# Patient Record
Sex: MALE | HISPANIC OR LATINO | Employment: FULL TIME | URBAN - METROPOLITAN AREA
[De-identification: names, ages, dates, MRNs, and addresses within clinical notes are randomized per-mention and may not be internally consistent; named-entity substitution may affect disease eponyms.]

---

## 2017-05-18 ENCOUNTER — HOSPITAL ENCOUNTER (OUTPATIENT)
Dept: LAB | Facility: MEDICAL CENTER | Age: 42
End: 2017-05-18
Attending: FAMILY MEDICINE
Payer: COMMERCIAL

## 2017-05-18 LAB
ALBUMIN SERPL BCP-MCNC: 4.3 G/DL (ref 3.2–4.9)
ALBUMIN/GLOB SERPL: 1.5 G/DL
ALP SERPL-CCNC: 60 U/L (ref 30–99)
ALT SERPL-CCNC: 35 U/L (ref 2–50)
ANION GAP SERPL CALC-SCNC: 6 MMOL/L (ref 0–11.9)
AST SERPL-CCNC: 24 U/L (ref 12–45)
BASOPHILS # BLD AUTO: 0.3 % (ref 0–1.8)
BASOPHILS # BLD: 0.01 K/UL (ref 0–0.12)
BILIRUB SERPL-MCNC: 0.4 MG/DL (ref 0.1–1.5)
BUN SERPL-MCNC: 20 MG/DL (ref 8–22)
CALCIUM SERPL-MCNC: 9.3 MG/DL (ref 8.5–10.5)
CHLORIDE SERPL-SCNC: 104 MMOL/L (ref 96–112)
CHOLEST SERPL-MCNC: 176 MG/DL (ref 100–199)
CO2 SERPL-SCNC: 29 MMOL/L (ref 20–33)
CREAT SERPL-MCNC: 1.03 MG/DL (ref 0.5–1.4)
EOSINOPHIL # BLD AUTO: 0.08 K/UL (ref 0–0.51)
EOSINOPHIL NFR BLD: 2.3 % (ref 0–6.9)
ERYTHROCYTE [DISTWIDTH] IN BLOOD BY AUTOMATED COUNT: 35.9 FL (ref 35.9–50)
EST. AVERAGE GLUCOSE BLD GHB EST-MCNC: 123 MG/DL
GFR SERPL CREATININE-BSD FRML MDRD: >60 ML/MIN/1.73 M 2
GLOBULIN SER CALC-MCNC: 2.9 G/DL (ref 1.9–3.5)
GLUCOSE SERPL-MCNC: 91 MG/DL (ref 65–99)
HBA1C MFR BLD: 5.9 % (ref 0–5.6)
HCT VFR BLD AUTO: 46.6 % (ref 42–52)
HDLC SERPL-MCNC: 39 MG/DL
HGB BLD-MCNC: 15.4 G/DL (ref 14–18)
IMM GRANULOCYTES # BLD AUTO: 0.01 K/UL (ref 0–0.11)
IMM GRANULOCYTES NFR BLD AUTO: 0.3 % (ref 0–0.9)
LDLC SERPL CALC-MCNC: 107 MG/DL
LYMPHOCYTES # BLD AUTO: 1.44 K/UL (ref 1–4.8)
LYMPHOCYTES NFR BLD: 42 % (ref 22–41)
MCH RBC QN AUTO: 27.3 PG (ref 27–33)
MCHC RBC AUTO-ENTMCNC: 33 G/DL (ref 33.7–35.3)
MCV RBC AUTO: 82.6 FL (ref 81.4–97.8)
MONOCYTES # BLD AUTO: 0.42 K/UL (ref 0–0.85)
MONOCYTES NFR BLD AUTO: 12.2 % (ref 0–13.4)
NEUTROPHILS # BLD AUTO: 1.47 K/UL (ref 1.82–7.42)
NEUTROPHILS NFR BLD: 42.9 % (ref 44–72)
NRBC # BLD AUTO: 0 K/UL
NRBC BLD AUTO-RTO: 0 /100 WBC
PLATELET # BLD AUTO: 148 K/UL (ref 164–446)
PMV BLD AUTO: 12.8 FL (ref 9–12.9)
POTASSIUM SERPL-SCNC: 4.3 MMOL/L (ref 3.6–5.5)
PROT SERPL-MCNC: 7.2 G/DL (ref 6–8.2)
RBC # BLD AUTO: 5.64 M/UL (ref 4.7–6.1)
SODIUM SERPL-SCNC: 139 MMOL/L (ref 135–145)
TRIGL SERPL-MCNC: 149 MG/DL (ref 0–149)
TSH SERPL DL<=0.005 MIU/L-ACNC: 1.78 UIU/ML (ref 0.3–3.7)
WBC # BLD AUTO: 3.4 K/UL (ref 4.8–10.8)

## 2017-05-18 PROCEDURE — 83036 HEMOGLOBIN GLYCOSYLATED A1C: CPT

## 2017-05-18 PROCEDURE — 80061 LIPID PANEL: CPT

## 2017-05-18 PROCEDURE — 84443 ASSAY THYROID STIM HORMONE: CPT

## 2017-05-18 PROCEDURE — 36415 COLL VENOUS BLD VENIPUNCTURE: CPT

## 2017-05-18 PROCEDURE — 85025 COMPLETE CBC W/AUTO DIFF WBC: CPT

## 2017-05-18 PROCEDURE — 80053 COMPREHEN METABOLIC PANEL: CPT

## 2018-04-30 ENCOUNTER — APPOINTMENT (OUTPATIENT)
Dept: BEHAVIORAL HEALTH | Facility: PHYSICIAN GROUP | Age: 43
End: 2018-04-30
Payer: COMMERCIAL

## 2018-09-12 ENCOUNTER — OFFICE VISIT (OUTPATIENT)
Dept: MEDICAL GROUP | Facility: MEDICAL CENTER | Age: 43
End: 2018-09-12
Payer: COMMERCIAL

## 2018-09-12 VITALS
RESPIRATION RATE: 16 BRPM | SYSTOLIC BLOOD PRESSURE: 130 MMHG | TEMPERATURE: 97.4 F | BODY MASS INDEX: 34.23 KG/M2 | WEIGHT: 213 LBS | DIASTOLIC BLOOD PRESSURE: 76 MMHG | HEART RATE: 71 BPM | OXYGEN SATURATION: 97 % | HEIGHT: 66 IN

## 2018-09-12 DIAGNOSIS — F31.81 BIPOLAR 2 DISORDER (HCC): ICD-10-CM

## 2018-09-12 DIAGNOSIS — I10 ESSENTIAL HYPERTENSION: ICD-10-CM

## 2018-09-12 DIAGNOSIS — E66.9 OBESITY (BMI 30-39.9): ICD-10-CM

## 2018-09-12 DIAGNOSIS — D69.6 THROMBOCYTOPENIA (HCC): ICD-10-CM

## 2018-09-12 DIAGNOSIS — D72.819 LEUKOPENIA, UNSPECIFIED TYPE: ICD-10-CM

## 2018-09-12 DIAGNOSIS — R73.01 IMPAIRED FASTING GLUCOSE: ICD-10-CM

## 2018-09-12 PROCEDURE — 99203 OFFICE O/P NEW LOW 30 MIN: CPT | Performed by: NURSE PRACTITIONER

## 2018-09-12 RX ORDER — LOSARTAN POTASSIUM 25 MG/1
25 TABLET ORAL DAILY
Qty: 30 TAB | Refills: 11 | Status: SHIPPED | OUTPATIENT
Start: 2018-09-12 | End: 2019-07-26

## 2018-09-12 RX ORDER — TRAZODONE HYDROCHLORIDE 100 MG/1
200 TABLET ORAL NIGHTLY
COMMUNITY
End: 2018-10-29 | Stop reason: SDUPTHER

## 2018-09-12 RX ORDER — LOSARTAN POTASSIUM 25 MG/1
25 TABLET ORAL DAILY
COMMUNITY
End: 2018-09-12 | Stop reason: SDUPTHER

## 2018-09-12 RX ORDER — ARIPIPRAZOLE 10 MG/1
10 TABLET ORAL DAILY
COMMUNITY
End: 2018-10-29 | Stop reason: SDUPTHER

## 2018-09-12 ASSESSMENT — PATIENT HEALTH QUESTIONNAIRE - PHQ9: CLINICAL INTERPRETATION OF PHQ2 SCORE: 0

## 2018-09-12 NOTE — PROGRESS NOTES
Subjective:      Alfonso Gudino is a 43 y.o. male who presents with Establish Care and Medication Refill        CC: This is a former patient of  here to establish care mainly for blood pressure and prediabetes.    HPI Alfonso Gudino      1. Impaired fasting glucose  Review of most recent lab work from May of last year shows hemoglobin A1c of 5.9. Patient is on Abilify and feels this has caused weight gain and increased his risk for diabetes. He denies polyuria or polyphagia.    2. Thrombocytopenia (HCC)  Review of CBC results from the other offices show mild thrombocytopenia with platelet count 148. It appears that it has been running fairly persistent and patient does not report any bleeding issues. He is on trazodone, Abilify and aspirin.    3. Leukopenia, unspecified type  His last white blood cell count from a year ago also was mildly low at 3.4. His white blood cell count from 2013 was normal.    4. Essential hypertension  Blood pressure noted to be elevated in the office today but patient states he has been out of his lisinopril 10 mg.    5. Bipolar 2 disorder (HCC)  Patient currently going to [facility] although he states he will be changing his psychiatrist soon. He has been on various medicines over the years but currently is on Abilify and trazodone. He states this is for bipolar disorder.    6. Obesity (BMI 30-39.9)  Patient reports his weight has been fairly stable.  Social History   Substance Use Topics   • Smoking status: Never Smoker   • Smokeless tobacco: Former User   • Alcohol use No     Current Outpatient Prescriptions   Medication Sig Dispense Refill   • traZODone (DESYREL) 100 MG Tab Take 100 mg by mouth every evening.     • ARIPiprazole (ABILIFY) 10 MG Tab Take 10 mg by mouth every day.     • losartan (COZAAR) 25 MG Tab Take 1 Tab by mouth every day. 30 Tab 11     No current facility-administered medications for this visit.      Past Medical History:   Diagnosis Date   • Angina    •  "Backpain    • Depression    • Hypertension    • Myocardial infarct (HCC)      Family History   Problem Relation Age of Onset   • Diabetes Mother    • Arthritis Mother    • Hypertension Mother    • No Known Problems Father        Review of Systems   All other systems reviewed and are negative.         Objective:     /76   Pulse 71   Temp 36.3 °C (97.4 °F)   Resp 16   Ht 1.676 m (5' 6\")   Wt 96.6 kg (213 lb)   SpO2 97%   BMI 34.38 kg/m²      Physical Exam   Constitutional: He is oriented to person, place, and time. He appears well-developed and well-nourished. No distress.   HENT:   Head: Normocephalic and atraumatic.   Right Ear: External ear normal.   Left Ear: External ear normal.   Nose: Nose normal.   Mouth/Throat: Oropharynx is clear and moist.   Eyes: Conjunctivae are normal. Right eye exhibits no discharge. Left eye exhibits no discharge.   Neck: Normal range of motion. Neck supple. No tracheal deviation present. No thyromegaly present.   Cardiovascular: Normal rate, regular rhythm and normal heart sounds.    No murmur heard.  Pulmonary/Chest: Effort normal and breath sounds normal. No respiratory distress. He has no wheezes. He has no rales.   Lymphadenopathy:     He has no cervical adenopathy.   Neurological: He is alert and oriented to person, place, and time. Coordination normal.   Skin: Skin is warm and dry. No rash noted. He is not diaphoretic. No erythema.   Psychiatric: He has a normal mood and affect. His behavior is normal. Judgment and thought content normal.   Nursing note and vitals reviewed.              Assessment/Plan:     1. Impaired fasting glucose  Patient at risk for type 2 diabetes with his age, family history and weight so I told him I would like to see a hemoglobin A1c and have him do regular lab work status once a year. He will follow-up if he needs to go on medications in the next few weeks.  - COMP METABOLIC PANEL; Future  - LIPID PROFILE; Future  - HEMOGLOBIN A1C; " Future    2. Thrombocytopenia (HCC)  Possibly secondary to his aspirin and trazodone although he reports no bleeding issues. I will continue to monitor.  - CBC WITH DIFFERENTIAL; Future    3. Leukopenia, unspecified type  Patient will do lab work to see if hematology referral is needed in the future.  - CBC WITH DIFFERENTIAL; Future    4. Essential hypertension  I will switch patient from lisinopril to losartan since there is less chance for cough with this medication. He has been out of his lisinopril for a week. I told him I would like to see his blood pressure closer to 130/80 or less.  - TSH; Future  - losartan (COZAAR) 25 MG Tab; Take 1 Tab by mouth every day.  Dispense: 30 Tab; Refill: 11    5. Bipolar 2 disorder (HCC)  Patient will continue to follow with psychiatry who is prescribing his psychiatric medicines. He asked if I would take over prescribing but I told him that since this is for bipolar disorder, it would be better for him to continue to follow with a mental health professional.  - TSH; Future    6. Obesity (BMI 30-39.9)    - Patient identified as having weight management issue.  Appropriate orders and counseling given.

## 2018-10-29 ENCOUNTER — OFFICE VISIT (OUTPATIENT)
Dept: BEHAVIORAL HEALTH | Facility: CLINIC | Age: 43
End: 2018-10-29
Payer: COMMERCIAL

## 2018-10-29 VITALS
DIASTOLIC BLOOD PRESSURE: 78 MMHG | HEART RATE: 64 BPM | BODY MASS INDEX: 36.15 KG/M2 | SYSTOLIC BLOOD PRESSURE: 159 MMHG | WEIGHT: 217 LBS | HEIGHT: 65 IN

## 2018-10-29 DIAGNOSIS — F31.81 BIPOLAR 2 DISORDER (HCC): ICD-10-CM

## 2018-10-29 DIAGNOSIS — Z79.899 ENCOUNTER FOR LONG-TERM (CURRENT) USE OF MEDICATIONS: ICD-10-CM

## 2018-10-29 PROCEDURE — 99203 OFFICE O/P NEW LOW 30 MIN: CPT | Performed by: PSYCHIATRY & NEUROLOGY

## 2018-10-29 RX ORDER — TRAZODONE HYDROCHLORIDE 100 MG/1
200 TABLET ORAL
Qty: 180 TAB | Refills: 1 | Status: SHIPPED | OUTPATIENT
Start: 2018-10-29 | End: 2019-02-25 | Stop reason: SDUPTHER

## 2018-10-29 RX ORDER — ARIPIPRAZOLE 10 MG/1
10 TABLET ORAL DAILY
Qty: 90 TAB | Refills: 1 | Status: SHIPPED | OUTPATIENT
Start: 2018-10-29 | End: 2019-02-25

## 2018-10-29 NOTE — PROGRESS NOTES
INITIAL PSYCHIATRY EVALUATION      Chief Complaint   Patient presents with   • Establish Care     history of bipolar mood disorder         History Of Present Illness:  Alfonso Gudino is a 43 y.o. old male with history of bipolar mood disorder, hypertension comes in today to establish care.  He was previously being followed at the [facility] but is transferring his care as he now has a better commercial insurance.  He was diagnosed with bipolar mood disorder in his early 30s when he was exhibiting mood and psychotic symptoms and is having significant relationship stressors with his now ex-wife.  He describes his mood symptoms as racing thoughts, impulsivity, decreased need for sleep lasting for several days in a row.  He also reports having some delusions of persecution.  He was having some financial stressors and was worried about being watched at that time.  He was also hospitalized in early 2000s for an episode where he barricaded him self and his family in a house and refused to come out.  Police was called who were able to get him and his family at safe.  He does not recall a lot about that incident but was started on medications after that.  He has been on Abilify for the last 3 or 4 years and endorses good benefit on his symptoms.  He denies any current or recent hypomanic, manic or psychotic symptoms.  He was also struggling with depression when he was having psychosocial stressors with his ex-wife but denies any current or recent symptoms of depression.  He has good support from his second wife and his mother who lives close by as well.  He denies any current legal stressors.  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  He recently got his JONELLE card and feels that that will help him not spend too much money on gambling.  He denies struggling with gambling before.  He denies any current active or passive thoughts of  wanting to hurt himself or others.    Current psychiatric medications - Abilify 10 mg (x 3-4 years), Trazodone 200 mg    Past Psychiatric History:  Prior psychiatric hospitalization - 2 in early to  for aggression and mood symptoms.  His first hospitalization was in [place] and second 1 in [place].  Self harm/suicide attempt - Denies  Previous medication trials - unable to recall    Past Medical/Surgical History:  Past Medical History:   Diagnosis Date   • Angina    • Backpain    • Depression    • Hypertension    • Myocardial infarct (HCC)      History reviewed. No pertinent surgical history.    Family Psychiatric History:  Father - psychotic disorder   Mother - depressive disorder    Substance Use/Addiction History:  Alcohol - Denies  Nicotine - Denies  Illicit drugs - Denies    Social History:  He was born in [facility] and was raised by his mother primarily.  He and his mother immigrated to [place] when he was 8 or 9 years old.  His mother lives close by him and his father lives in [place] but he does not have a relationship with him anymore.  He has 3/2 siblings and has minimal relationships with them.  He has been  x2 and  x1.  He had 4 kids from his first marriage but his son  at the age of 3.  His daughters live in [place] and they are all over 18 years of age.  He lives in [place] with his wife of 2 years.  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    Allergies:  Lisinopril    Medications:  Current Outpatient Prescriptions   Medication Sig Dispense Refill   • ARIPiprazole (ABILIFY) 10 MG Tab Take 1 Tab by mouth every day. 90 Tab 1   • traZODone (DESYREL) 100 MG Tab Take 2 Tabs by mouth every bedtime. 180 Tab 1   • losartan (COZAAR) 25 MG Tab Take 1 Tab by mouth every day. 30 Tab 11     No current facility-administered medications for this visit.        Review of Symptoms:  Constitutional - Negative for fatigue  Eyes - Negative for blurry  "vision  HEENT - Negative for sore throat  Respiratory - Negative for shortness of breath, cough  CVS - Negative for chest pain, palpitations  GI - Negative for nausea, vomiting, abdominal pain, diarrhea, constipation  Skin - Negative for rash  Musculoskeletal - Negative for back pain  Neurological - Negative for headaches  Psychiatric - Negative for depression, hypomania, corina, psychosis    Physical Examination:  Vital signs: /78   Pulse 64   Ht 1.651 m (5' 5\")   Wt 98.4 kg (217 lb)   BMI 36.11 kg/m²     Musculoskeletal: Normal gait. No abnormal movements.     Mental Status Evaluation:   General: Middle aged  male, dressed in casual attire, good grooming and hygiene, in no apparent distress, calm and cooperative, good eye contact, no psychomotor agitation or retardation  Orientation: Alert and oriented to person, place and time  Recent and remote memory: Intact  Attention span and concentration: Intact  Speech: Spontaneous, normal rate, rhythm and tone  Thought Process: Linear, logical and goal directed  Thought Content: Denies suicidal or homicidal ideations, intent or plan  Perception: Denies auditory or visual hallucinations. No delusions noted  Associations: Intact  Language: Appropriate  Fund of knowledge and vocabulary: Adequate  Mood: \"good\"  Affect: Euthymic, mood congruent  Insight: Good  Judgment: Good    Depression screening:  Depression Screen (PHQ-2/PHQ-9) 9/12/2018   PHQ-2 Total Score 0     Interpretation of PHQ-9 Total Score   Score Severity   1-4 No Depression   5-9 Mild Depression   10-14 Moderate Depression   15-19 Moderately Severe Depression   20-27 Severe Depression    Medical Records/Labs/Diagnostic Tests Reviewed:  [place]  records - no prescribed controlled medications found in the last 3 years    Impression:  1.  Bipolar 2 mood disorder    Plan:  1.  Continue Abilify 10 mg in the morning for mood stabilization   - AIMS 0 today   - Metabolic monitoring (5/2017): A1c elevated " at 5.9, LDL elevated at 107   - A1c and lipid profile ordered for yearly metabolic monitoring  2.  Continue Trazodone 200 mg at bedtime for sleep    Return to clinic in 4 months or sooner if symptoms worsen    The proposed treatment plan was discussed with the patient who was provided the opportunity to ask questions and make suggestions regarding alternative treatment. Patient verbalized understanding and expressed agreement with the plan.     Gia Sapp M.D.  10/29/18    This note was created using voice recognition software (Dragon). The accuracy of the dictation is limited by the abilities of the software. I have reviewed the note prior to signing, however some errors in grammar and context are still possible. If you have any questions related to this note please do not hesitate to contact our office.

## 2019-02-25 ENCOUNTER — OFFICE VISIT (OUTPATIENT)
Dept: BEHAVIORAL HEALTH | Facility: CLINIC | Age: 44
End: 2019-02-25
Payer: COMMERCIAL

## 2019-02-25 VITALS
SYSTOLIC BLOOD PRESSURE: 159 MMHG | DIASTOLIC BLOOD PRESSURE: 90 MMHG | HEART RATE: 62 BPM | HEIGHT: 65 IN | BODY MASS INDEX: 35.65 KG/M2 | WEIGHT: 214 LBS

## 2019-02-25 DIAGNOSIS — F31.81 BIPOLAR 2 DISORDER (HCC): ICD-10-CM

## 2019-02-25 PROCEDURE — 99213 OFFICE O/P EST LOW 20 MIN: CPT | Performed by: PSYCHIATRY & NEUROLOGY

## 2019-02-25 RX ORDER — ARIPIPRAZOLE 15 MG/1
15 TABLET ORAL DAILY
Qty: 90 TAB | Refills: 0 | Status: SHIPPED | OUTPATIENT
Start: 2019-02-25 | End: 2019-05-28

## 2019-02-25 RX ORDER — TRAZODONE HYDROCHLORIDE 100 MG/1
200 TABLET ORAL
Qty: 180 TAB | Refills: 0 | Status: SHIPPED | OUTPATIENT
Start: 2019-02-25 | End: 2019-05-28 | Stop reason: SDUPTHER

## 2019-02-25 NOTE — PROGRESS NOTES
PSYCHIATRY FOLLOW-UP NOTE      Chief Complaint   Patient presents with   • Follow-Up     mood         History Of Present Illness:  Alfonso Gudino is a 43 y.o. old male with bipolar 2 mood disorder, hypertension comes in today for follow up, was last seen for an initial evaluation 4 months ago.  He reports doing good in regards to his mood since his last appointment with me.  He continues to be compliant with Abilify and endorses benefit on his mood symptoms.  He denies any recent or current symptoms consistent with depression, hypomania or corina.  He denies struggling with anger or irritability.  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  He has noticed that if he misses his Abilify and takes to the next day he is more focused at work.  He denies any recent episodes of gambling.  He works at a local [employer] and does have some cravings to go negro but has not done any gambling since his last appointment with me.  He denies any increase in his psychosocial stressors.  He reports good sleep with 200 mg of Trazodone.  He denies any current problems with his appetite.  He denies having active or passive thoughts of wanting to hurt himself or others.    Social History:   He was born in [facility] and was raised by his mother primarily.  He and his mother immigrated to [place] when he was 8 or 9 years old.  His mother lives close by him and his father lives in [place] but he does not have a relationship with him anymore.  He has 3 1/2 siblings but has minimal contact with them.  He has been  x2 and  x1.  He had 4 kids from his first marriage but his son  at the age of 3.  His all 3 daughters live in [place].  He lives in [place] with his wife.  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    Substance Use:  Alcohol - Denies  Nicotine - Denies  Illicit drugs -  "Denies    Past Medication Trials:  Unable to recall     Medications:  Current Outpatient Prescriptions   Medication Sig Dispense Refill   • ARIPiprazole (ABILIFY) 15 MG Tab Take 1 Tab by mouth every day. 90 Tab 0   • traZODone (DESYREL) 100 MG Tab Take 2 Tabs by mouth every bedtime. 180 Tab 0   • losartan (COZAAR) 25 MG Tab Take 1 Tab by mouth every day. (Patient not taking: Reported on 2/25/2019) 30 Tab 11     No current facility-administered medications for this visit.        Review Of Systems:    Constitutional - Positive for fatigue  Respiratory - Negative for shortness of breath, cough  CVS - Negative for chest pain, palpitations  GI - Negative for nausea, vomiting, abdominal pain, diarrhea, constipation  Musculoskeletal - Negative for back pain  Neurological - Negative for headaches  Psychiatric - Positive for impaired concentration    Physical Examination:  Vital signs: /90   Pulse 62   Ht 1.651 m (5' 5\")   Wt 97.1 kg (214 lb)   BMI 35.61 kg/m²     Musculoskeletal: Normal gait. No abnormal movements.     Mental Status Evaluation:   General: Middle aged  male, dressed in casual attire, good grooming and hygiene, in no apparent distress, calm and cooperative, good eye contact, no psychomotor agitation or retardation  Orientation: Alert and oriented to person, place and time  Recent and remote memory: Grossly intact  Attention span and concentration: Grossly intact  Speech: Spontaneous, normal rate, rhythm and tone  Thought Process: Linear, logical and goal directed  Thought Content: Denies suicidal or homicidal ideations, intent or plan  Perception: Denies auditory or visual hallucinations. No delusions noted  Associations: Intact  Language: Appropriate  Fund of knowledge and vocabulary: Grossly adequate  Mood: \"really good\"  Affect: Euthymic, mood congruent  Insight: Good  Judgment: Good    Depression screening:  Depression Screen (PHQ-2/PHQ-9) 9/12/2018   PHQ-2 Total Score 0 "     Interpretation of PHQ-9 Total Score   Score Severity   1-4 No Depression   5-9 Mild Depression   10-14 Moderate Depression   15-19 Moderately Severe Depression   20-27 Severe Depression    Medical Records/Labs/Diagnostic Tests Reviewed:  [place]  records - no prescribed controlled medications found in the last 1 years      Impression:  1.  Bipolar 2 mood disorder - stable    Plan:  1. Increase Abilify to 15 mg in the morning for mood stabilization.  He is endorsing stable mood symptoms but would like to try a higher dose to see if it helps with his concentration.  He has a history of on and off gambling ever since he has been on Abilify for the last 3-4 years.  I have informed him that Abilify has been associated with some impulse control disorders including gambling and to keep that in mind with increased dose.  I have asked him to stop taking Abilify and to contact me if he notices strong desire to gambling which he is unable to control to discuss alternative medications for his bipolar mood disorder.              - AIMS 0 (10/2018)              - Metabolic monitoring (5/2017): A1c elevated at 5.9, LDL elevated at 107              - A1c and lipid profile still pending. Reminded him to get the blood work done.  2.  Continue Trazodone 200 mg at bedtime for sleep  3.  He is off his blood pressure medication for over a month and his blood pressure was elevated today.  I have advised him to set up an appointment with his PCP to discuss this further.    Return to clinic in 3 months or sooner if symptoms worsen    The proposed treatment plan was discussed with the patient who was provided the opportunity to ask questions and make suggestions regarding alternative treatment. Patient verbalized understanding and expressed agreement with the plan.     Gia Sapp M.D.  02/25/19    This note was created using voice recognition software (Dragon). The accuracy of the dictation is limited by the abilities of the software.  I have reviewed the note prior to signing, however some errors in grammar and context are still possible. If you have any questions related to this note please do not hesitate to contact our office.

## 2019-02-26 ENCOUNTER — HOSPITAL ENCOUNTER (OUTPATIENT)
Dept: LAB | Facility: MEDICAL CENTER | Age: 44
End: 2019-02-26
Attending: NURSE PRACTITIONER
Payer: COMMERCIAL

## 2019-02-26 DIAGNOSIS — D72.819 LEUKOPENIA, UNSPECIFIED TYPE: ICD-10-CM

## 2019-02-26 DIAGNOSIS — D69.6 THROMBOCYTOPENIA (HCC): ICD-10-CM

## 2019-02-26 DIAGNOSIS — R73.01 IMPAIRED FASTING GLUCOSE: ICD-10-CM

## 2019-02-26 DIAGNOSIS — I10 ESSENTIAL HYPERTENSION: ICD-10-CM

## 2019-02-26 DIAGNOSIS — F31.81 BIPOLAR 2 DISORDER (HCC): ICD-10-CM

## 2019-02-26 LAB
ALBUMIN SERPL BCP-MCNC: 4.3 G/DL (ref 3.2–4.9)
ALBUMIN/GLOB SERPL: 1.6 G/DL
ALP SERPL-CCNC: 57 U/L (ref 30–99)
ALT SERPL-CCNC: 45 U/L (ref 2–50)
ANION GAP SERPL CALC-SCNC: 4 MMOL/L (ref 0–11.9)
AST SERPL-CCNC: 25 U/L (ref 12–45)
BASOPHILS # BLD AUTO: 0.7 % (ref 0–1.8)
BASOPHILS # BLD: 0.03 K/UL (ref 0–0.12)
BILIRUB SERPL-MCNC: 0.3 MG/DL (ref 0.1–1.5)
BUN SERPL-MCNC: 14 MG/DL (ref 8–22)
CALCIUM SERPL-MCNC: 8.8 MG/DL (ref 8.5–10.5)
CHLORIDE SERPL-SCNC: 107 MMOL/L (ref 96–112)
CHOLEST SERPL-MCNC: 187 MG/DL (ref 100–199)
CO2 SERPL-SCNC: 28 MMOL/L (ref 20–33)
CREAT SERPL-MCNC: 0.98 MG/DL (ref 0.5–1.4)
EOSINOPHIL # BLD AUTO: 0.06 K/UL (ref 0–0.51)
EOSINOPHIL NFR BLD: 1.5 % (ref 0–6.9)
ERYTHROCYTE [DISTWIDTH] IN BLOOD BY AUTOMATED COUNT: 36.2 FL (ref 35.9–50)
EST. AVERAGE GLUCOSE BLD GHB EST-MCNC: 126 MG/DL
FASTING STATUS PATIENT QL REPORTED: NORMAL
GLOBULIN SER CALC-MCNC: 2.7 G/DL (ref 1.9–3.5)
GLUCOSE SERPL-MCNC: 107 MG/DL (ref 65–99)
HBA1C MFR BLD: 6 % (ref 0–5.6)
HCT VFR BLD AUTO: 46.6 % (ref 42–52)
HDLC SERPL-MCNC: 34 MG/DL
HGB BLD-MCNC: 15.2 G/DL (ref 14–18)
IMM GRANULOCYTES # BLD AUTO: 0.01 K/UL (ref 0–0.11)
IMM GRANULOCYTES NFR BLD AUTO: 0.2 % (ref 0–0.9)
LDLC SERPL CALC-MCNC: 116 MG/DL
LYMPHOCYTES # BLD AUTO: 1.48 K/UL (ref 1–4.8)
LYMPHOCYTES NFR BLD: 36.9 % (ref 22–41)
MCH RBC QN AUTO: 27.1 PG (ref 27–33)
MCHC RBC AUTO-ENTMCNC: 32.6 G/DL (ref 33.7–35.3)
MCV RBC AUTO: 83.1 FL (ref 81.4–97.8)
MONOCYTES # BLD AUTO: 0.49 K/UL (ref 0–0.85)
MONOCYTES NFR BLD AUTO: 12.2 % (ref 0–13.4)
NEUTROPHILS # BLD AUTO: 1.94 K/UL (ref 1.82–7.42)
NEUTROPHILS NFR BLD: 48.5 % (ref 44–72)
NRBC # BLD AUTO: 0 K/UL
NRBC BLD-RTO: 0 /100 WBC
PLATELET # BLD AUTO: 146 K/UL (ref 164–446)
PMV BLD AUTO: 12.4 FL (ref 9–12.9)
POTASSIUM SERPL-SCNC: 4.1 MMOL/L (ref 3.6–5.5)
PROT SERPL-MCNC: 7 G/DL (ref 6–8.2)
RBC # BLD AUTO: 5.61 M/UL (ref 4.7–6.1)
SODIUM SERPL-SCNC: 139 MMOL/L (ref 135–145)
TRIGL SERPL-MCNC: 187 MG/DL (ref 0–149)
TSH SERPL DL<=0.005 MIU/L-ACNC: 2.09 UIU/ML (ref 0.38–5.33)
WBC # BLD AUTO: 4 K/UL (ref 4.8–10.8)

## 2019-02-26 PROCEDURE — 36415 COLL VENOUS BLD VENIPUNCTURE: CPT

## 2019-02-26 PROCEDURE — 83036 HEMOGLOBIN GLYCOSYLATED A1C: CPT

## 2019-02-26 PROCEDURE — 80053 COMPREHEN METABOLIC PANEL: CPT

## 2019-02-26 PROCEDURE — 84443 ASSAY THYROID STIM HORMONE: CPT

## 2019-02-26 PROCEDURE — 85025 COMPLETE CBC W/AUTO DIFF WBC: CPT

## 2019-02-26 PROCEDURE — 80061 LIPID PANEL: CPT

## 2019-05-28 ENCOUNTER — OFFICE VISIT (OUTPATIENT)
Dept: BEHAVIORAL HEALTH | Facility: CLINIC | Age: 44
End: 2019-05-28
Payer: COMMERCIAL

## 2019-05-28 VITALS
SYSTOLIC BLOOD PRESSURE: 164 MMHG | WEIGHT: 214 LBS | BODY MASS INDEX: 35.65 KG/M2 | DIASTOLIC BLOOD PRESSURE: 85 MMHG | HEART RATE: 72 BPM | HEIGHT: 65 IN

## 2019-05-28 DIAGNOSIS — E78.5 DYSLIPIDEMIA: ICD-10-CM

## 2019-05-28 DIAGNOSIS — R73.03 PRE-DIABETES: ICD-10-CM

## 2019-05-28 DIAGNOSIS — F31.81 BIPOLAR 2 DISORDER (HCC): ICD-10-CM

## 2019-05-28 PROCEDURE — 99214 OFFICE O/P EST MOD 30 MIN: CPT | Performed by: PSYCHIATRY & NEUROLOGY

## 2019-05-28 RX ORDER — TRAZODONE HYDROCHLORIDE 100 MG/1
200 TABLET ORAL
Qty: 180 TAB | Refills: 0 | Status: SHIPPED | OUTPATIENT
Start: 2019-05-28 | End: 2019-07-26

## 2019-05-28 ASSESSMENT — PATIENT HEALTH QUESTIONNAIRE - PHQ9: CLINICAL INTERPRETATION OF PHQ2 SCORE: 0

## 2019-07-23 ENCOUNTER — OFFICE VISIT (OUTPATIENT)
Dept: BEHAVIORAL HEALTH | Facility: CLINIC | Age: 44
End: 2019-07-23
Payer: COMMERCIAL

## 2019-07-23 VITALS
HEIGHT: 65 IN | WEIGHT: 200 LBS | SYSTOLIC BLOOD PRESSURE: 167 MMHG | DIASTOLIC BLOOD PRESSURE: 91 MMHG | BODY MASS INDEX: 33.32 KG/M2 | HEART RATE: 71 BPM

## 2019-07-23 DIAGNOSIS — F51.04 CHRONIC INSOMNIA: ICD-10-CM

## 2019-07-23 DIAGNOSIS — I10 ESSENTIAL HYPERTENSION: ICD-10-CM

## 2019-07-23 DIAGNOSIS — F31.81 BIPOLAR 2 DISORDER (HCC): ICD-10-CM

## 2019-07-23 PROCEDURE — 99214 OFFICE O/P EST MOD 30 MIN: CPT | Performed by: PSYCHIATRY & NEUROLOGY

## 2019-07-23 RX ORDER — ZIPRASIDONE HYDROCHLORIDE 20 MG/1
20 CAPSULE ORAL 2 TIMES DAILY
Qty: 60 CAP | Refills: 0 | Status: SHIPPED | OUTPATIENT
Start: 2019-07-23 | End: 2019-08-21 | Stop reason: SDUPTHER

## 2019-07-23 NOTE — PROGRESS NOTES
"PSYCHIATRY FOLLOW-UP NOTE      Chief Complaint   Patient presents with   • Other     more irritable without medications         History Of Present Illness:  Alfonso Gudino is a 44 y.o. old male with bipolar 2 mood disorder, hypertension, dyslipidemia, prediabetes comes in today for follow up, was last seen 2 months ago.  He has been off his Abilify for 3+ months and recently has been noticing more irritability and agitation.  He has been working 7 days a week and is having a lot more stressors at his home as well.  He states that his stepdaughter has been living with her family for over 3 years and he has a hard time as she does not clean up after.  He also has a his stepson living with them but has a good relationship with him.  He wants his stepdaughter and her family to move out but is having a hard time with that.  He recently had a blowout with them which caused a lot of stressors in his marriage.  He does not feel like more on edge and has been told by a lot of people that he looks \"pissed\".  He denies any reckless or impulsive behaviors without Abilify.  He has not had a gambling episodes since he has been on Abilify.  He continues to use trazodone for sleep and feels that it helps most days than not.  He has made some lifestyle changes and is eating better and has lost about 14 pounds since his last appointment with me.  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  He denies any physical outbursts of anger.  He denies having thoughts of wanting to hurt himself or others.    Social History:   He was born in [facility] and was raised by his mother primarily.  He and his mother immigrated to [place] when he was 8 or 9 years old.  His mother lives close by and father lives in [place] but he does not have a relationship with him anymore.  He has 3 1/2 siblings but has minimal contact with them.  He has been  x2 and " " x1.  He had 4 kids from his first marriage but his son  at the age of 3.  His all 3 daughters live in [place].  He lives in [place] with his wife.  His stepson, stepdaughter and her  and daughter also live with them. He is employed full-time as a  at an [employer] restaurant at a local [employer].  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    Substance Use:  Alcohol - Denies  Nicotine - Denies  Illicit drugs - Denies    Past Medication Trials:  Abilify 15 mg (effective, s/e - impulse control disorder - gambling), Seroquel (s/e - \"it messed with my blood cells\")    Medications:  Current Outpatient Prescriptions   Medication Sig Dispense Refill   • ziprasidone (GEODON) 20 MG Cap Take 1 Cap by mouth 2 Times a Day. 60 Cap 0   • traZODone (DESYREL) 100 MG Tab Take 2 Tabs by mouth every bedtime. 180 Tab 0   • losartan (COZAAR) 25 MG Tab Take 1 Tab by mouth every day. (Patient not taking: Reported on 2019) 30 Tab 11     No current facility-administered medications for this visit.        Review Of Systems:    Constitutional - Negative for fatigue  Respiratory - Negative for shortness of breath, cough  CVS - Negative for chest pain, palpitations  GI - Negative for nausea, vomiting, abdominal pain, diarrhea, constipation  Musculoskeletal - Negative for back pain  Neurological - Negative for headaches  Psychiatric - Positive for irritability, agitation, infrequent poor sleep    Physical Examination:  Vital signs: BP (!) 167/91   Pulse 71   Ht 1.651 m (5' 5\")   Wt 90.7 kg (200 lb)   BMI 33.28 kg/m²     Musculoskeletal: Normal gait. No abnormal movements.     Mental Status Evaluation:   General: Middle aged  male, dressed in casual attire, good grooming and hygiene, in no apparent distress, calm and cooperative, good eye contact, no psychomotor agitation or retardation  Orientation: Alert and oriented to person, place and time  Recent and remote memory: Grossly " "intact  Attention span and concentration: Grossly intact  Speech: Spontaneous, normal rate, rhythm and tone  Thought Process: Linear, logical and goal directed  Thought Content: Denies suicidal or homicidal ideations, intent or plan  Perception: Denies auditory or visual hallucinations. No delusions noted  Associations: Intact  Language: Appropriate  Fund of knowledge and vocabulary: Grossly adequate  Mood: \"am okay\"  Affect: Euthymic, mood congruent  Insight: Good  Judgment: Good    Depression screening:  Depression Screen (PHQ-2/PHQ-9) 9/12/2018 5/28/2019   PHQ-2 Total Score 0 0     Interpretation of PHQ-9 Total Score   Score Severity   1-4 No Depression   5-9 Mild Depression   10-14 Moderate Depression   15-19 Moderately Severe Depression   20-27 Severe Depression    Medical Records/Labs/Diagnostic Tests Reviewed:  [place]  records - no prescribed controlled medications found in the last 1 year      Impression:  1.  Bipolar 2 mood disorder - worsening   2.  Hypertension - new problem  3.  Chronic insomnia - new problem    Plan:  1.  Start Geodon 20 mg twice daily for mood stabilization. Discussed side effects including headache, drowsiness, dizziness, sedation, dry mouth, constipation, weight gain, orthostatic hypotension, hypertension, dyslipidemia, hyperglycemia, diabetes mellitus, akathisia/restlessness, tremors, muscle rigidity, acute dystonia, tardive dyskinesia etc.  - AIMS 0 today  - Metabolic monitoring (2/2019): Lipid profile with elevated LDL and triglycerides, A1c elevated at 6.0  2.  He is not an appropriate candidate for Depakote at this time given thrombocytopenia, dyslipidemia and weight gain.  He would benefit more from Geodon compared to Lamictal as he is having significant irritability and agitation.  3.  Continue Trazodone 200 mg at bedtime for sleep  4.  I have advised him to make some lifestyle changes including taking 1-2 days of a week to distress himself and to discuss with his wife about " improving situation at home.  I have informed him that he really needs to work on cutting back on his stress which can precipitate hypomanic or manic episode.  5.  His blood pressure continues to be elevated and he has been off his antihypertensive medication for 3+ months.  I have encouraged him to see his PCP for appropriate management.    Return to clinic in 4 weeks or sooner if symptoms worsen    The proposed treatment plan was discussed with the patient who was provided the opportunity to ask questions and make suggestions regarding alternative treatment. Patient verbalized understanding and expressed agreement with the plan.     Gia Sapp M.D.  07/23/19    This note was created using voice recognition software (Dragon). The accuracy of the dictation is limited by the abilities of the software. I have reviewed the note prior to signing, however some errors in grammar and context are still possible. If you have any questions related to this note please do not hesitate to contact our office.

## 2019-07-26 ENCOUNTER — OFFICE VISIT (OUTPATIENT)
Dept: MEDICAL GROUP | Facility: MEDICAL CENTER | Age: 44
End: 2019-07-26
Payer: COMMERCIAL

## 2019-07-26 VITALS
TEMPERATURE: 97.8 F | SYSTOLIC BLOOD PRESSURE: 138 MMHG | OXYGEN SATURATION: 96 % | RESPIRATION RATE: 16 BRPM | HEIGHT: 65 IN | HEART RATE: 72 BPM | WEIGHT: 200 LBS | BODY MASS INDEX: 33.32 KG/M2 | DIASTOLIC BLOOD PRESSURE: 72 MMHG

## 2019-07-26 DIAGNOSIS — R73.01 IMPAIRED FASTING GLUCOSE: ICD-10-CM

## 2019-07-26 DIAGNOSIS — D69.6 THROMBOCYTOPENIA (HCC): ICD-10-CM

## 2019-07-26 DIAGNOSIS — E78.5 DYSLIPIDEMIA: ICD-10-CM

## 2019-07-26 DIAGNOSIS — D72.819 LEUKOPENIA, UNSPECIFIED TYPE: ICD-10-CM

## 2019-07-26 DIAGNOSIS — F51.04 CHRONIC INSOMNIA: ICD-10-CM

## 2019-07-26 DIAGNOSIS — I10 ESSENTIAL HYPERTENSION: ICD-10-CM

## 2019-07-26 DIAGNOSIS — F31.81 BIPOLAR 2 DISORDER (HCC): ICD-10-CM

## 2019-07-26 PROBLEM — R73.03 PRE-DIABETES: Status: RESOLVED | Noted: 2019-05-28 | Resolved: 2019-07-26

## 2019-07-26 PROCEDURE — 99214 OFFICE O/P EST MOD 30 MIN: CPT | Performed by: NURSE PRACTITIONER

## 2019-07-26 RX ORDER — LOSARTAN POTASSIUM 50 MG/1
50 TABLET ORAL DAILY
Qty: 30 TAB | Refills: 11 | Status: CANCELLED | OUTPATIENT
Start: 2019-07-26

## 2019-07-26 RX ORDER — LOSARTAN POTASSIUM 50 MG/1
50 TABLET ORAL DAILY
Qty: 30 TAB | Refills: 11 | Status: SHIPPED | OUTPATIENT
Start: 2019-07-26

## 2019-07-26 ASSESSMENT — ENCOUNTER SYMPTOMS
DEPRESSION: 1
INSOMNIA: 1

## 2019-07-26 NOTE — PROGRESS NOTES
Subjective:      Alfonso Gudino is a 44 y.o. male who presents with Hypertension        CC: Patient is here today for follow-up on impaired fasting glucose, hypertension, dyslipidemia and insomnia.  Patient last seen at this office for his initial visit in September of last year.    HPI Alfonso Gudino      1. Impaired fasting glucose  Patient has history of prediabetes with hemoglobin A1c of 5.9 and his most recent testing in February up to 6.0.  He was on Abilify and is now on Geodon.  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  He has been trying to watch his carbohydrates.    2. Thrombocytopenia ([facility])  History of thrombocytopenia with last platelet count just mildly decreased at 146 and he reports no excessive bruising or bleeding.    3. Leukopenia, unspecified type  Last white blood cell count at 4.0 with no micro or macrocytosis or anemia.  Results were higher than the previous 3.4.    4. Essential hypertension  Patient is concerned about his blood pressure.  He was on low-dose losartan 25 mg but with the recall of losartan, he has not been using it.  Apparently his blood pressure has been elevated at other offices although it is normal in the office today.    5. Dyslipidemia  Patient has had mild dyslipidemia but not high enough to warrant need of statin.      6. Bipolar 2 disorder (HCC)  Patient currently following with psychiatry which is adjusting his medications with the addition of Abilify and he has stopped his trazodone.    7. Chronic insomnia  Patient reports with the stopping of his trazodone he has had some issues with staying asleep.  He is wondering if there is anything I can prescribe for him.  Current Outpatient Prescriptions   Medication Sig Dispense Refill   • losartan (COZAAR) 50 MG Tab Take 1 Tab by mouth every day. 30 Tab 11   • ziprasidone (GEODON) 20 MG Cap Take 1 Cap by mouth 2  "Times a Day. 60 Cap 0     No current facility-administered medications for this visit.      Social History   Substance Use Topics   • Smoking status: Never Smoker   • Smokeless tobacco: Former User   • Alcohol use No      Comment: 1 glass of wine once a week     Family History   Problem Relation Age of Onset   • Diabetes Mother    • Arthritis Mother    • Hypertension Mother    • Depression Mother    • Other Father         psychosis     Past Medical History:   Diagnosis Date   • Angina    • Backpain    • Depression    • Hypertension    • Myocardial infarct (HCC)        Review of Systems   Psychiatric/Behavioral: Positive for depression. The patient has insomnia.    All other systems reviewed and are negative.         Objective:     /72 (BP Location: Right arm, Patient Position: Sitting, BP Cuff Size: Adult)   Pulse 72   Temp 36.6 °C (97.8 °F) (Temporal)   Resp 16   Ht 1.651 m (5' 5\")   Wt 90.7 kg (200 lb)   SpO2 96%   BMI 33.28 kg/m²      Physical Exam   Constitutional: He is oriented to person, place, and time. He appears well-developed and well-nourished. No distress.   HENT:   Head: Normocephalic and atraumatic.   Right Ear: External ear normal.   Left Ear: External ear normal.   Nose: Nose normal.   Mouth/Throat: Oropharynx is clear and moist.   Eyes: Conjunctivae are normal. Right eye exhibits no discharge. Left eye exhibits no discharge.   Neck: Normal range of motion. Neck supple. No tracheal deviation present. No thyromegaly present.   Cardiovascular: Normal rate, regular rhythm and normal heart sounds.    No murmur heard.  Pulmonary/Chest: Effort normal and breath sounds normal. No respiratory distress. He has no wheezes. He has no rales.   Lymphadenopathy:     He has no cervical adenopathy.   Neurological: He is alert and oriented to person, place, and time. Coordination normal.   Skin: Skin is warm and dry. No rash noted. He is not diaphoretic. No erythema.   Psychiatric: He has a normal mood " and affect. His behavior is normal. Judgment and thought content normal.   Nursing note and vitals reviewed.              Assessment/Plan:     1. Impaired fasting glucose  Patient's last hemoglobin A1c at 6.0 but he states he is decreasing his carbohydrates, losing weight and exercising.  I will have him do repeat hemoglobin A1c before his visit in 3 months.  - HEMOGLOBIN A1C; Future  - Comp Metabolic Panel; Future      2. Thrombocytopenia (HCC)  Platelet counts have been barely below normal we will continue to monitor and he is to report any excessive bruising or bleeding.  - CBC WITH DIFFERENTIAL; Future    3. Leukopenia, unspecified type  White blood cell count has been stable and he is showed no anemia.  - CBC WITH DIFFERENTIAL; Future    4. Essential hypertension  Patient's blood pressure is reported to be elevated so I will start him back on losartan but at 50 mg.  I explained that I would like to see his blood pressure 130/80 or less and that we can increase the dosage if necessary in the future.    5. Dyslipidemia  Patient has had lab work done in the last few months and it should improve somewhat with his working out daily.    6. Bipolar 2 disorder (HCC)  Patient follows with psychiatry which is prescribing his Geodon which was recently started.  He states he is in a new relationship which is helpful and much of his depression was related to his previous divorce.    7. Chronic insomnia  I advised patient to try over-the-counter melatonin but if he feels that his insomnia is severe the stopping of the trazodone, I advised him to discuss this with the psychiatrist.

## 2019-08-16 ENCOUNTER — APPOINTMENT (OUTPATIENT)
Dept: RADIOLOGY | Facility: MEDICAL CENTER | Age: 44
End: 2019-08-16
Attending: EMERGENCY MEDICINE
Payer: COMMERCIAL

## 2019-08-16 ENCOUNTER — HOSPITAL ENCOUNTER (EMERGENCY)
Facility: MEDICAL CENTER | Age: 44
End: 2019-08-17
Attending: EMERGENCY MEDICINE
Payer: COMMERCIAL

## 2019-08-16 ENCOUNTER — NON-PROVIDER VISIT (OUTPATIENT)
Dept: OCCUPATIONAL MEDICINE | Facility: CLINIC | Age: 44
End: 2019-08-16
Payer: COMMERCIAL

## 2019-08-16 DIAGNOSIS — Z02.83 ENCOUNTER FOR DRUG SCREENING: ICD-10-CM

## 2019-08-16 DIAGNOSIS — I10 HYPERTENSION, UNSPECIFIED TYPE: ICD-10-CM

## 2019-08-16 DIAGNOSIS — Z02.1 PRE-EMPLOYMENT DRUG SCREENING: ICD-10-CM

## 2019-08-16 LAB
ALBUMIN SERPL BCP-MCNC: 4.8 G/DL (ref 3.2–4.9)
ALBUMIN/GLOB SERPL: 1.5 G/DL
ALP SERPL-CCNC: 64 U/L (ref 30–99)
ALT SERPL-CCNC: 47 U/L (ref 2–50)
ANION GAP SERPL CALC-SCNC: 10 MMOL/L (ref 0–11.9)
AST SERPL-CCNC: 35 U/L (ref 12–45)
BASOPHILS # BLD AUTO: 0.4 % (ref 0–1.8)
BASOPHILS # BLD: 0.02 K/UL (ref 0–0.12)
BILIRUB SERPL-MCNC: 0.4 MG/DL (ref 0.1–1.5)
BUN SERPL-MCNC: 17 MG/DL (ref 8–22)
CALCIUM SERPL-MCNC: 9.6 MG/DL (ref 8.5–10.5)
CHLORIDE SERPL-SCNC: 105 MMOL/L (ref 96–112)
CO2 SERPL-SCNC: 26 MMOL/L (ref 20–33)
CREAT SERPL-MCNC: 0.99 MG/DL (ref 0.5–1.4)
EKG IMPRESSION: NORMAL
EOSINOPHIL # BLD AUTO: 0.04 K/UL (ref 0–0.51)
EOSINOPHIL NFR BLD: 0.8 % (ref 0–6.9)
ERYTHROCYTE [DISTWIDTH] IN BLOOD BY AUTOMATED COUNT: 37.1 FL (ref 35.9–50)
GLOBULIN SER CALC-MCNC: 3.1 G/DL (ref 1.9–3.5)
GLUCOSE SERPL-MCNC: 106 MG/DL (ref 65–99)
HCT VFR BLD AUTO: 47.4 % (ref 42–52)
HGB BLD-MCNC: 15.1 G/DL (ref 14–18)
IMM GRANULOCYTES # BLD AUTO: 0.01 K/UL (ref 0–0.11)
IMM GRANULOCYTES NFR BLD AUTO: 0.2 % (ref 0–0.9)
LYMPHOCYTES # BLD AUTO: 1.78 K/UL (ref 1–4.8)
LYMPHOCYTES NFR BLD: 35.6 % (ref 22–41)
MCH RBC QN AUTO: 26.5 PG (ref 27–33)
MCHC RBC AUTO-ENTMCNC: 31.9 G/DL (ref 33.7–35.3)
MCV RBC AUTO: 83.2 FL (ref 81.4–97.8)
MONOCYTES # BLD AUTO: 0.55 K/UL (ref 0–0.85)
MONOCYTES NFR BLD AUTO: 11 % (ref 0–13.4)
NEUTROPHILS # BLD AUTO: 2.6 K/UL (ref 1.82–7.42)
NEUTROPHILS NFR BLD: 52 % (ref 44–72)
NRBC # BLD AUTO: 0 K/UL
NRBC BLD-RTO: 0 /100 WBC
PLATELET # BLD AUTO: 177 K/UL (ref 164–446)
PMV BLD AUTO: 12.2 FL (ref 9–12.9)
POTASSIUM SERPL-SCNC: 3.6 MMOL/L (ref 3.6–5.5)
PROT SERPL-MCNC: 7.9 G/DL (ref 6–8.2)
RBC # BLD AUTO: 5.7 M/UL (ref 4.7–6.1)
SODIUM SERPL-SCNC: 141 MMOL/L (ref 135–145)
TROPONIN T SERPL-MCNC: <6 NG/L (ref 6–19)
WBC # BLD AUTO: 5 K/UL (ref 4.8–10.8)

## 2019-08-16 PROCEDURE — 80053 COMPREHEN METABOLIC PANEL: CPT

## 2019-08-16 PROCEDURE — 93005 ELECTROCARDIOGRAM TRACING: CPT

## 2019-08-16 PROCEDURE — 85025 COMPLETE CBC W/AUTO DIFF WBC: CPT

## 2019-08-16 PROCEDURE — 71045 X-RAY EXAM CHEST 1 VIEW: CPT

## 2019-08-16 PROCEDURE — 84484 ASSAY OF TROPONIN QUANT: CPT

## 2019-08-16 PROCEDURE — 700101 HCHG RX REV CODE 250: Performed by: EMERGENCY MEDICINE

## 2019-08-16 PROCEDURE — 93005 ELECTROCARDIOGRAM TRACING: CPT | Performed by: EMERGENCY MEDICINE

## 2019-08-16 PROCEDURE — 82075 ASSAY OF BREATH ETHANOL: CPT | Performed by: PREVENTIVE MEDICINE

## 2019-08-16 PROCEDURE — 80305 DRUG TEST PRSMV DIR OPT OBS: CPT | Performed by: PREVENTIVE MEDICINE

## 2019-08-16 PROCEDURE — 8895 PB URINE 6 PANEL AFTER HOURS: Performed by: PREVENTIVE MEDICINE

## 2019-08-16 RX ORDER — LABETALOL HYDROCHLORIDE 5 MG/ML
20 INJECTION, SOLUTION INTRAVENOUS ONCE
Status: COMPLETED | OUTPATIENT
Start: 2019-08-16 | End: 2019-08-16

## 2019-08-16 RX ADMIN — LABETALOL HYDROCHLORIDE 20 MG: 5 INJECTION INTRAVENOUS at 22:37

## 2019-08-16 NOTE — LETTER
"  FORM C-4:  EMPLOYEE’S CLAIM FOR COMPENSATION/ REPORT OF INITIAL TREATMENT  EMPLOYEE’S CLAIM - PROVIDE ALL INFORMATION REQUESTED   First Name  Alfonso Last Name  Shahab Birthdate             Age  1975 44 y.o. 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 Be specific and answer in detail. Use additional sheet if necessary)  I was making a Butter sauce and the wisk I was using was full of dirt and The lid on top was not put on tight. The Dirt went in the sauce with the top of the lid inside the Butter Sauce. That made my Blood pressure go up to 190 I was very upset. I don't play with my guest food   If you believe that you have an occupational disease, when did you first have knowledge of the disability and it relationship to your employment?  N/A Witnesses to the Accident  Damir Hollis Carmelo     Nature of Injury or " Occupational Disease  Workers' Compensation  Part(s) of Body Injured or Affected  Heart, Chest   I certify that the above is true and correct to the best of my knowledge and that I have provided this information in order to obtain the benefits of [place] or [place]).  I hereby authorize any physician, chiropractor, surgeon, practitioner, or other person, any hospital, including [facility], any medical service organization, any insurance company, or other institution or organization to release to each other, any medical or other information, including benefits paid or payable, pertinent to this injury or disease, except information relative to diagnosis, treatment and/or counseling for AIDS, psychological conditions, alcohol or controlled substances, for which I must give specific authorization.  A Photostat of this authorization shall be as valid as the original.   Date  August 16, 2019 Place  [facility] ED Employee’s Signature   THIS REPORT MUST BE COMPLETED AND MAILED WITHIN 3 WORKING DAYS OF TREATMENT   Place  [facility], EMERGENCY DEPT  Name of Facility   [facility]   Date  8/16/2019 Diagnosis  (I10) Hypertension, unspecified type Is there evidence the injured employee was under the influence of alcohol and/or another controlled substance at the time of accident?   Hour  11:28 PM Description of Injury or Disease  Hypertension, unspecified type No   Treatment  Labetalol for htn  Have you advised the patient to remain off work five days or more?         No   X-Ray Findings  Negative   If Yes   From Date    To Date      From information given by the employee, together with medical evidence, can you directly connect this injury or occupational disease as job incurred?  No If No, is the employee capable of: Full Duty  Yes Modified Duty      Is  "additional medical care by a physician indicated?  Yes If Modified Duty, Specify any Limitations / Restrictions        Do you know of any previous injury or disease contributing to this condition or occupational disease?  Yes   Date  8/16/2019 Print Doctor’s Name  AguirreAnastacio I certify the employer’s copy of this form was mailed on:   Address  [address] [facility] [id number] [phone] Insurer’s Use Only   [facility]  [address]    Provider’s Tax ID Number  [id number] Telephone  Dept: [phone]    Doctor’s Signature  e-ANASTACIO Rosenthal M.D. Degree   MD    Original - TREATING PHYSICIAN OR CHIROPRACTOR   Pg 2-Insurer/TPA   Pg 3-Employer   Pg 4-Employee                                                                                                  Form C-4 (rev01/03)     BRIEF DESCRIPTION OF RIGHTS AND BENEFITS  (Pursuant to [place] 616C.050)    Notice of Injury or Occupational Disease (Incident Report Form C-1): If an injury or occupational disease (OD) arises out of and in the course of employment, you must provide written notice to your employer as soon as practicable, but no later than 7 days after the accident or OD. Your employer shall maintain a sufficient supply of the required forms.  Claim for Compensation (Form C-4): If medical treatment is sought, the form C-4 is available at the place of initial treatment. A completed \"Claim for Compensation\" (Form C-4) must be filed within 90 days after an accident or OD. The treating physician or chiropractor must, within 3 working days after treatment, complete and mail to the employer, the employer's insurer and third-party , the Claim for Compensation.  Medical Treatment: If you require medical treatment for your on-the-job injury or OD, you may be required to select a physician or chiropractor from a list provided by your workers’ compensation insurer, if it has contracted with an Organization for Managed Care (MCO) or " Preferred Provider Organization (PPO) or providers of health care. If your employer has not entered into a contract with an MCO or PPO, you may select a physician or chiropractor from the Panel of Physicians and Chiropractors. Any medical costs related to your industrial injury or OD will be paid by your insurer.  Temporary Total Disability (TTD): If your doctor has certified that you are unable to work for a period of at least 5 consecutive days, or 5 cumulative days in a 20-day period, or places restrictions on you that your employer does not accommodate, you may be entitled to TTD compensation.  Temporary Partial Disability (TPD): If the wage you receive upon reemployment is less than the compensation for TTD to which you are entitled, the insurer may be required to pay you TPD compensation to make up the difference. TPD can only be paid for a maximum of 24 months.  Permanent Partial Disability (PPD): When your medical condition is stable and there is an indication of a PPD as a result of your injury or OD, within 30 days, your insurer must arrange for an evaluation by a rating physician or chiropractor to determine the degree of your PPD. The amount of your PPD award depends on the date of injury, the results of the PPD evaluation and your age and wage.  Permanent Total Disability (PTD): If you are medically certified by a treating physician or chiropractor as permanently and totally disabled and have been granted a PTD status by your insurer, you are entitled to receive monthly benefits not to exceed 66 2/3% of your average monthly wage. The amount of your PTD payments is subject to reduction if you previously received a PPD award.  Vocational Rehabilitation Services: You may be eligible for vocational rehabilitation services if you are unable to return to the job due to a permanent physical impairment or permanent restrictions as a result of your injury or occupational disease.  Transportation and Per Ngozi  Reimbursement: You may be eligible for travel expenses and per magali associated with medical treatment.  Reopening: You may be able to reopen your claim if your condition worsens after claim closure.  Appeal Process: If you disagree with a written determination issued by the insurer or the insurer does not respond to your request, you may appeal to the Department of Administration, , by following the instructions contained in your determination letter. You must appeal the determination within 70 days from the date of the determination letter at [address], [address], [place], [place] [address], or [address], [address], [place], [place] [address]. If you disagree with the  decision, you may appeal to the Department of Administration, . You must file your appeal within 30 days from the date of the  decision letter at [address], [address], [place], [place] [address], or [address] [facility], [place], [place] [address]. If you disagree with a decision of an , you may file a petition for judicial review with the District Court. You must do so within 30 days of the Appeal Officer’s decision. You may be represented by an  at your own expense or you may contact the [facility] for possible representation.  [place]  for Injured Workers (NAIW): If you disagree with a  decision, you may request that NAIW represent you without charge at an  Hearing. For information regarding denial of benefits, you may contact the [facility] at: [address], [address], [place] [address], [phone], or [address] [facility] [address] [address], [phone]  To File a Complaint with the Division: If you wish to file a complaint with the  of the Division of Industrial Relations (DIR), please contact the Workers’ Compensation Section, [address],  [address], [place], [place] [address], telephone [phone], or [address], [address], [place], [place] [address], telephone [phone].  For assistance with Workers’ Compensation Issues: you may contact the Office of the Governor Consumer Health Assistance, [address] [place] [address], [address], [place], [place] [address], Toll Free 1-371.546.9719, Web site: http://govcha.[email or web address]., E-mail [email or web address].                                                                                                                                                                                   __________________________________________________________________                                    _________________            Employee Name / Signature                                                                                                                            Date                                       D-2 (rev. 10/07)

## 2019-08-16 NOTE — LETTER
"  FORM C-4:  EMPLOYEE’S CLAIM FOR COMPENSATION/ REPORT OF INITIAL TREATMENT  EMPLOYEE’S CLAIM - PROVIDE ALL INFORMATION REQUESTED   First Name  Alfonso Last Name  Shahab Birthdate             Age  1975 44 y.o. Sex  male Claim Number   Home Employee Address  [address]  [facility] [facility]                                     Zip  [address] Height  1.651 m (5' 5\") Weight  90 kg (198 lb 6.6 oz) [facility]     Mailing Employee Address                           [address]   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 Be specific and answer in detail. Use additional sheet if necessary)  I was working on a Butter sauce and the Lid on top of the wisk came off the handle was filled with Dirt and the small Lid put in place no tight. The Dirt and the Lid came off in to the sauce and I got really upset because somebody tried to play with the tools I was using which didn't belong to me   If you believe that you have an occupational disease, when did you first have knowledge of the disability and it relationship to your employment?  N/A Witnesses to the Accident  Farida Rodrigues Carmelo, Rey, Maklee     Nature of Injury or " Occupational Disease  Workers' Compensation  Part(s) of Body Injured or Affected  Heart, Chest   I certify that the above is true and correct to the best of my knowledge and that I have provided this information in order to obtain the benefits of [place] or [place]  I hereby authorize any physician, chiropractor, surgeon, practitioner, or other person, any hospital, including [facility] or [facility], any medical service organization, any insurance company, or other institution or organization to release to each other, any medical or other information, including benefits paid or payable, pertinent to this injury or disease, except information relative to diagnosis, treatment and/or counseling for AIDS, psychological conditions, alcohol or controlled substances, for which I must give specific authorization.  A Photostat of this authorization shall be as valid as the original.   Date  August 17, 2019 Place  [facility] ED Employee’s Signature   THIS REPORT MUST BE COMPLETED AND MAILED WITHIN 3 WORKING DAYS OF TREATMENT   Place  [facility], EMERGENCY DEPT  Name of Facility   [facility]   Date  8/16/2019 Diagnosis  (I10) Hypertension, unspecified type Is there evidence the injured employee was under the influence of alcohol and/or another controlled substance at the time of accident?   Hour  12:01 AM Description of Injury or Disease  Hypertension, unspecified type No   Treatment  Labetalol for htn  Have you advised the patient to remain off work five days or more?         No   X-Ray Findings  Negative   If Yes   From Date    To Date      From information given by the employee, together with medical evidence, can you directly connect this injury or occupational disease as job incurred?  No If No, is the employee capable of: Full Duty  Yes Modified Duty      Is  "additional medical care by a physician indicated?  Yes If Modified Duty, Specify any Limitations / Restrictions        Do you know of any previous injury or disease contributing to this condition or occupational disease?  Yes   Date  8/17/2019   Print Doctor’s Name  AguirreAnastacio I certify the employer’s copy of this form was mailed on:   Address  [address] [id number] [phone] Insurer’s Use Only   [facility]  [id number]    Provider’s Tax ID Number  [id number] Telephone  Dept: [phone]    Doctor’s Signature  e-ANASTACIO Rosenthal M.D. Degree   MD    Original - TREATING PHYSICIAN OR CHIROPRACTOR   Pg 2-Insurer/TPA   Pg 3-Employer   Pg 4-Employee                                                                                                  Form C-4 (rev01/03)     BRIEF DESCRIPTION OF RIGHTS AND BENEFITS  (Pursuant to [place] 616C.050)  Notice of Injury or Occupational Disease (Incident Report Form C-1): If an injury or occupational disease (OD) arises out of and in the course of employment, you must provide written notice to your employer as soon as practicable, but no later than 7 days after the accident or OD. Your employer shall maintain a sufficient supply of the required forms.  Claim for Compensation (Form C-4): If medical treatment is sought, the form C-4 is available at the place of initial treatment. A completed \"Claim for Compensation\" (Form C-4) must be filed within 90 days after an accident or OD. The treating physician or chiropractor must, within 3 working days after treatment, complete and mail to the employer, the employer's insurer and third-party , the Claim for Compensation.  Medical Treatment: If you require medical treatment for your on-the-job injury or OD, you may be required to select a physician or chiropractor from a list provided by your workers’ compensation insurer, if it has contracted with an Organization for Managed Care (MCO) or " Preferred Provider Organization (PPO) or providers of health care. If your employer has not entered into a contract with an MCO or PPO, you may select a physician or chiropractor from the Panel of Physicians and Chiropractors. Any medical costs related to your industrial injury or OD will be paid by your insurer.  Temporary Total Disability (TTD): If your doctor has certified that you are unable to work for a period of at least 5 consecutive days, or 5 cumulative days in a 20-day period, or places restrictions on you that your employer does not accommodate, you may be entitled to TTD compensation.  Temporary Partial Disability (TPD): If the wage you receive upon reemployment is less than the compensation for TTD to which you are entitled, the insurer may be required to pay you TPD compensation to make up the difference. TPD can only be paid for a maximum of 24 months.  Permanent Partial Disability (PPD): When your medical condition is stable and there is an indication of a PPD as a result of your injury or OD, within 30 days, your insurer must arrange for an evaluation by a rating physician or chiropractor to determine the degree of your PPD. The amount of your PPD award depends on the date of injury, the results of the PPD evaluation and your age and wage.  Permanent Total Disability (PTD): If you are medically certified by a treating physician or chiropractor as permanently and totally disabled and have been granted a PTD status by your insurer, you are entitled to receive monthly benefits not to exceed 66 2/3% of your average monthly wage. The amount of your PTD payments is subject to reduction if you previously received a PPD award.  Vocational Rehabilitation Services: You may be eligible for vocational rehabilitation services if you are unable to return to the job due to a permanent physical impairment or permanent restrictions as a result of your injury or occupational disease.  Transportation and Per Ngozi  Reimbursement: You may be eligible for travel expenses and per magali associated with medical treatment.  Reopening: You may be able to reopen your claim if your condition worsens after claim closure.  Appeal Process: If you disagree with a written determination issued by the insurer or the insurer does not respond to your request, you may appeal to the Department of Administration, , by following the instructions contained in your determination letter. You must appeal the determination within 70 days from the date of the determination letter at [address], [address], [place], [place] [address], or [address], [place], [place] [address]. If you disagree with the  decision, you may appeal to the Department of Administration, . You must file your appeal within 30 days from the date of the  decision letter at [address], [address], [place], [place] [address], or [address], [address], [place], [place] [address]. If you disagree with a decision of an , you may file a petition for judicial review with the District Court. You must do so within 30 days of the Appeal Officer’s decision. You may be represented by an  at your own expense or you may contact the [facility] for possible representation.  [place]  for Injured Workers (NAIW): If you disagree with a  decision, you may request that NAIW represent you without charge at an  Hearing. For information regarding denial of benefits, you may contact the [facility] at: [address], [address], [place] [address], [phone], or [address], [facility] [address] [address], [phone]  To File a Complaint with the Division: If you wish to file a complaint with the  of the Division of Industrial Relations (DIR), please contact the Workers’ Compensation Section, [address],  [address], [place], [place] [address], telephone [phone]) [phone], or [address], [address], [place], [place] [address], telephone [phone]) [phone].  For assistance with Workers’ Compensation Issues: you may contact the Office of the Governor Consumer Health Assistance, [address] [place] [address], [address], [place], [place] [address], Toll Free 1-589.243.4187, Web site: http://govcha.[facility].[place]., E-mail nette@[facility].                                                                                                                                                                                         __________________________________________________________________                                    _________________            Employee Name / Signature                                                                                                                            Date                                       D-2 (rev. 10/07)

## 2019-08-17 VITALS
OXYGEN SATURATION: 98 % | HEIGHT: 65 IN | BODY MASS INDEX: 33.06 KG/M2 | WEIGHT: 198.41 LBS | RESPIRATION RATE: 17 BRPM | TEMPERATURE: 98.1 F | DIASTOLIC BLOOD PRESSURE: 89 MMHG | HEART RATE: 70 BPM | SYSTOLIC BLOOD PRESSURE: 151 MMHG

## 2019-08-17 PROCEDURE — 99284 EMERGENCY DEPT VISIT MOD MDM: CPT

## 2019-08-17 NOTE — ED PROVIDER NOTES
"ED Provider Note    Scribed for Dr. Anastacio Aguirre M.D. by Silvia Samaniego. 8/16/2019  9:10 PM    Primary care provider: DAMIAN Gruber  Means of arrival: Walk-In  History obtained from: Patient  History limited by: None    CHIEF COMPLAINT  Chief Complaint   Patient presents with   • Hypertension       HPI  Alfonso Gudino is a 44 y.o. male who presents to the Emergency Department for evaluation of hypertension onset prior to arrival. He reports he became agitated at work when his coworkers played a joke on him, causing his blood pressure to rise.  The patient currently endorses associated chest pressure, a \"severe\" headache, and tightness to his neck. No alleviating or exacerbating factors are identified. He notes he is currently on Losartan for his hypertension but states his blood pressures continue to be difficult to control. He notes, however, his hypertension has been improving recently with diet changes. The patient denies fever.     REVIEW OF SYSTEMS  Pertinent positives include chest pressure, headache, tightness to neck. Pertinent negatives include no fever. As above, all other systems reviewed and are negative.   See HPI for further details.     PAST MEDICAL HISTORY   has a past medical history of Angina, Backpain, Depression, Hypertension, and Myocardial infarct (HCC).    SURGICAL HISTORY  patient denies any surgical history    SOCIAL HISTORY  Social History     Tobacco Use   • Smoking status: Never Smoker   • Smokeless tobacco: Former User   Substance Use Topics   • Alcohol use: No     Comment: 1 glass of wine once a week   • Drug use: No      Social History     Substance and Sexual Activity   Drug Use No       FAMILY HISTORY  Family History   Problem Relation Age of Onset   • Diabetes Mother    • Arthritis Mother    • Hypertension Mother    • Depression Mother    • Other Father         psychosis       CURRENT MEDICATIONS  Home Medications     Reviewed by Sunni Ansari R.N. (Registered " "Nurse) on 08/16/19 at 1856  Med List Status: Complete   Medication Last Dose Status   losartan (COZAAR) 50 MG Tab 8/16/2019 Active   ziprasidone (GEODON) 20 MG Cap 8/16/2019 Active                ALLERGIES  Allergies   Allergen Reactions   • Lisinopril      Dry cough       PHYSICAL EXAM  VITAL SIGNS: /107   Pulse 90   Temp 36.7 °C (98.1 °F) (Temporal)   Resp 18   Ht 1.651 m (5' 5\")   Wt 90 kg (198 lb 6.6 oz)   SpO2 98%   BMI 33.02 kg/m²     Constitutional: Well developed, Well nourished, no distress, Non-toxic appearance.   HENT: Normocephalic, Atraumatic, Bilateral external ears normal, Oropharynx moist, No oral exudates.   Eyes: PERRLA, EOMI, Conjunctiva normal, No discharge.   Neck: Posterior neck tightness and tenderness, Supple, No stridor.   Lymphatic: No lymphadenopathy noted.   Cardiovascular: Normal heart rate, Normal rhythm.   Thorax & Lungs: Clear to auscultation bilaterally, No respiratory distress, No wheezing, No crackles.   Abdomen: Soft, No tenderness, No masses, No pulsatile masses.   Skin: Warm, Dry, No erythema, No rash.   Extremities:, No edema No cyanosis.   Musculoskeletal: No tenderness to palpation or major deformities noted.  Intact distal pulses  Neurologic: Awake, alert. Moves all extremities spontaneously.  Psychiatric: Affect normal, Judgment normal, Mood normal.     LABS  Results for orders placed or performed during the hospital encounter of 08/16/19   CBC with Differential   Result Value Ref Range    WBC 5.0 4.8 - 10.8 K/uL    RBC 5.70 4.70 - 6.10 M/uL    Hemoglobin 15.1 14.0 - 18.0 g/dL    Hematocrit 47.4 42.0 - 52.0 %    MCV 83.2 81.4 - 97.8 fL    MCH 26.5 (L) 27.0 - 33.0 pg    MCHC 31.9 (L) 33.7 - 35.3 g/dL    RDW 37.1 35.9 - 50.0 fL    Platelet Count 177 164 - 446 K/uL    MPV 12.2 9.0 - 12.9 fL    Neutrophils-Polys 52.00 44.00 - 72.00 %    Lymphocytes 35.60 22.00 - 41.00 %    Monocytes 11.00 0.00 - 13.40 %    Eosinophils 0.80 0.00 - 6.90 %    Basophils 0.40 0.00 - " 1.80 %    Immature Granulocytes 0.20 0.00 - 0.90 %    Nucleated RBC 0.00 /100 WBC    Neutrophils (Absolute) 2.60 1.82 - 7.42 K/uL    Lymphs (Absolute) 1.78 1.00 - 4.80 K/uL    Monos (Absolute) 0.55 0.00 - 0.85 K/uL    Eos (Absolute) 0.04 0.00 - 0.51 K/uL    Baso (Absolute) 0.02 0.00 - 0.12 K/uL    Immature Granulocytes (abs) 0.01 0.00 - 0.11 K/uL    NRBC (Absolute) 0.00 K/uL   Complete Metabolic Panel (CMP)   Result Value Ref Range    Sodium 141 135 - 145 mmol/L    Potassium 3.6 3.6 - 5.5 mmol/L    Chloride 105 96 - 112 mmol/L    Co2 26 20 - 33 mmol/L    Anion Gap 10.0 0.0 - 11.9    Glucose 106 (H) 65 - 99 mg/dL    Bun 17 8 - 22 mg/dL    Creatinine 0.99 0.50 - 1.40 mg/dL    Calcium 9.6 8.5 - 10.5 mg/dL    AST(SGOT) 35 12 - 45 U/L    ALT(SGPT) 47 2 - 50 U/L    Alkaline Phosphatase 64 30 - 99 U/L    Total Bilirubin 0.4 0.1 - 1.5 mg/dL    Albumin 4.8 3.2 - 4.9 g/dL    Total Protein 7.9 6.0 - 8.2 g/dL    Globulin 3.1 1.9 - 3.5 g/dL    A-G Ratio 1.5 g/dL   Troponin   Result Value Ref Range    Troponin T <6 6 - 19 ng/L   ESTIMATED GFR   Result Value Ref Range    GFR If African American >60 >60 mL/min/1.73 m 2    GFR If Non African American >60 >60 mL/min/1.73 m 2   EKG   Result Value Ref Range    Report       [facility] Emergency Dept.    Test Date:  2019  Pt Name:    JIMENA YEPEZ                  Department: ER  MRN:        [id number]                      Room:       [facility]  Gender:     Male                         Technician: [id number]  :        1975                   Requested By:ER TRIAGE PROTOCOL  Order #:    [id number]                    Reading MD: SHARRI RIVERA MD    Measurements  Intervals                                Axis  Rate:       77                           P:          12  MA:         155                          QRS:        59  QRSD:       106                          T:          48  QT:         382  QTc:        433    Interpretive Statements  Sinus rhythm  ST elev, probable  normal early repol pattern  Compared to ECG 03/12/2013 22:48:29  No significant changes    Electronically Signed On 8- 21:15:23 PDT by ANASTACIO RIVERA MD         All labs reviewed by me.    EKG  Interpreted by me as shown above.     RADIOLOGY  DX-CHEST-PORTABLE (1 VIEW)   Final Result      No acute cardiopulmonary abnormality.        The radiologist's interpretation of all radiological studies have been reviewed by me.    COURSE & MEDICAL DECISION MAKING  Pertinent Labs & Imaging studies reviewed. (See chart for details)    9:10 PM - Patient seen and examined at bedside. Ordered DX-Chest Portable, Estimated GFR, CBC w/diff, CMP, Troponin, and EKG to evaluate his symptoms. The differential diagnoses include but are not limited to: anxiety, hypertension, and acute coronary syndrome.     Decision Making:  Patient had some anxiety some chest pressure and elevated blood pressure anxiety and chest pressure seem to have resolved he remained a little hypertensive but and was treated with labetalol.  Got resolved febrile as well and work-up was unremarkable.  Patient referred for follow-up back to his primary to adjust his blood pressure medications he does not appear to have any coronary injury.  I think he is quite tense with some neck tenderness just an anxiety.  But that seems to resolved as well  The patient will return for new or worsening symptoms and is stable at the time of discharge.    DISPOSITION:  Patient will be discharged home in stable condition.    FINAL IMPRESSION  1. Hypertension, unspecified type          I, Silvia Samaniego (Karen), am scribing for, and in the presence of, Anastacio Rivera M.D..    Electronically signed by: Silvia Samaniego (Radhaibe), 8/16/2019    IAnastacio M.D. personally performed the services described in this documentation, as scribed by Silvia Samaniego in my presence, and it is both accurate and complete.    The note accurately reflects work and decisions made by me.  Anastacio  Timothy  8/16/2019  11:37 PM   C

## 2019-08-17 NOTE — ED TRIAGE NOTES
.  Chief Complaint   Patient presents with   • Hypertension     biba from work. Pt is a  at the [employer]. Thinks someone was playing a practice joke on him when he became hypertensive. Hx hypertension. Pt taking medications as prescribed.

## 2019-08-17 NOTE — ED NOTES
"Pt states was having, hypertension chest pain and sob at work following a work prank.  Pt  Still is having chest discomfort \" cant describe it\". And feels like cant catch breath, is on losartan ad new psych med he does not know name too.  Protocols ordered.   "

## 2019-08-19 LAB
AMP AMPHETAMINE: NORMAL
BREATH ALCOHOL COMMENT: NORMAL
COC COCAINE: NORMAL
INT CON NEG: NORMAL
INT CON POS: NORMAL
MET METHAMPHETAMINES: NORMAL
OPI OPIATES: NORMAL
PCP PHENCYCLIDINE: NORMAL
POC BREATHALIZER: 0 PERCENT (ref 0–0.01)
POC DRUG COMMENT 753798-OCCUPATIONAL HEALTH: NEGATIVE
THC: NORMAL

## 2020-02-15 ENCOUNTER — HOSPITAL ENCOUNTER (EMERGENCY)
Dept: HOSPITAL 8 - ED | Age: 45
Discharge: HOME | End: 2020-02-15
Payer: COMMERCIAL

## 2020-02-15 VITALS — HEIGHT: 67 IN | BODY MASS INDEX: 32.35 KG/M2 | WEIGHT: 206.13 LBS

## 2020-02-15 VITALS — DIASTOLIC BLOOD PRESSURE: 96 MMHG | SYSTOLIC BLOOD PRESSURE: 174 MMHG

## 2020-02-15 DIAGNOSIS — M54.5: Primary | ICD-10-CM

## 2020-02-15 DIAGNOSIS — I10: ICD-10-CM

## 2020-02-15 PROCEDURE — 99283 EMERGENCY DEPT VISIT LOW MDM: CPT

## 2020-02-15 PROCEDURE — 96372 THER/PROPH/DIAG INJ SC/IM: CPT

## 2020-02-15 NOTE — NUR
PT HERE WITH RIGHT SIDED BACK PAIN THAT RADIATES DOWN LEG, STATES FLORIAN TOLD HIM 
HE HAS A "SLIPPED DISC."